# Patient Record
Sex: FEMALE
[De-identification: names, ages, dates, MRNs, and addresses within clinical notes are randomized per-mention and may not be internally consistent; named-entity substitution may affect disease eponyms.]

---

## 2019-09-24 PROBLEM — Z00.00 ENCOUNTER FOR PREVENTIVE HEALTH EXAMINATION: Status: ACTIVE | Noted: 2019-09-24

## 2019-10-01 ENCOUNTER — APPOINTMENT (OUTPATIENT)
Dept: GASTROENTEROLOGY | Facility: CLINIC | Age: 49
End: 2019-10-01
Payer: COMMERCIAL

## 2019-10-01 VITALS — DIASTOLIC BLOOD PRESSURE: 73 MMHG | SYSTOLIC BLOOD PRESSURE: 109 MMHG | HEART RATE: 75 BPM | HEIGHT: 62 IN

## 2019-10-01 DIAGNOSIS — L81.8 OTHER SPECIFIED DISORDERS OF PIGMENTATION: ICD-10-CM

## 2019-10-01 DIAGNOSIS — Z86.010 PERSONAL HISTORY OF COLONIC POLYPS: ICD-10-CM

## 2019-10-01 DIAGNOSIS — Z78.9 OTHER SPECIFIED HEALTH STATUS: ICD-10-CM

## 2019-10-01 DIAGNOSIS — Z80.0 ENCOUNTER FOR SCREENING FOR MALIGNANT NEOPLASM OF COLON: ICD-10-CM

## 2019-10-01 DIAGNOSIS — K64.9 UNSPECIFIED HEMORRHOIDS: ICD-10-CM

## 2019-10-01 DIAGNOSIS — Z12.11 ENCOUNTER FOR SCREENING FOR MALIGNANT NEOPLASM OF COLON: ICD-10-CM

## 2019-10-01 PROCEDURE — 99203 OFFICE O/P NEW LOW 30 MIN: CPT

## 2019-10-01 RX ORDER — SODIUM SULFATE, POTASSIUM SULFATE, MAGNESIUM SULFATE 17.5; 3.13; 1.6 G/ML; G/ML; G/ML
17.5-3.13-1.6 SOLUTION, CONCENTRATE ORAL
Qty: 1 | Refills: 0 | Status: ACTIVE | COMMUNITY
Start: 2019-10-01 | End: 1900-01-01

## 2019-10-14 ENCOUNTER — APPOINTMENT (OUTPATIENT)
Dept: GASTROENTEROLOGY | Facility: AMBULATORY MEDICAL SERVICES | Age: 49
End: 2019-10-14

## 2019-10-17 ENCOUNTER — APPOINTMENT (OUTPATIENT)
Dept: OBGYN | Facility: CLINIC | Age: 49
End: 2019-10-17

## 2020-06-16 ENCOUNTER — EMERGENCY (EMERGENCY)
Facility: HOSPITAL | Age: 50
LOS: 0 days | Discharge: HOME | End: 2020-06-16
Attending: EMERGENCY MEDICINE
Payer: COMMERCIAL

## 2020-06-16 VITALS
SYSTOLIC BLOOD PRESSURE: 95 MMHG | DIASTOLIC BLOOD PRESSURE: 51 MMHG | OXYGEN SATURATION: 99 % | HEART RATE: 97 BPM | RESPIRATION RATE: 19 BRPM | TEMPERATURE: 99 F

## 2020-06-16 VITALS — HEIGHT: 62 IN | WEIGHT: 130.07 LBS

## 2020-06-16 DIAGNOSIS — M54.9 DORSALGIA, UNSPECIFIED: ICD-10-CM

## 2020-06-16 DIAGNOSIS — M54.5 LOW BACK PAIN: ICD-10-CM

## 2020-06-16 LAB
APPEARANCE UR: CLEAR — SIGNIFICANT CHANGE UP
BILIRUB UR-MCNC: NEGATIVE — SIGNIFICANT CHANGE UP
COLOR SPEC: YELLOW — SIGNIFICANT CHANGE UP
DIFF PNL FLD: ABNORMAL
GLUCOSE UR QL: NEGATIVE MG/DL — SIGNIFICANT CHANGE UP
KETONES UR-MCNC: NEGATIVE — SIGNIFICANT CHANGE UP
LEUKOCYTE ESTERASE UR-ACNC: ABNORMAL
NITRITE UR-MCNC: NEGATIVE — SIGNIFICANT CHANGE UP
PH UR: 6.5 — SIGNIFICANT CHANGE UP (ref 5–8)
PROT UR-MCNC: NEGATIVE MG/DL — SIGNIFICANT CHANGE UP
SP GR SPEC: 1.01 — SIGNIFICANT CHANGE UP (ref 1.01–1.02)
UROBILINOGEN FLD QL: NEGATIVE MG/DL — SIGNIFICANT CHANGE UP

## 2020-06-16 PROCEDURE — 72100 X-RAY EXAM L-S SPINE 2/3 VWS: CPT | Mod: 26

## 2020-06-16 PROCEDURE — 99284 EMERGENCY DEPT VISIT MOD MDM: CPT

## 2020-06-16 PROCEDURE — 72100 X-RAY EXAM L-S SPINE 2/3 VWS: CPT

## 2020-06-16 PROCEDURE — 81001 URINALYSIS AUTO W/SCOPE: CPT

## 2020-06-16 PROCEDURE — 99283 EMERGENCY DEPT VISIT LOW MDM: CPT | Mod: 25

## 2020-06-16 PROCEDURE — 87086 URINE CULTURE/COLONY COUNT: CPT

## 2020-06-16 RX ORDER — METHOCARBAMOL 500 MG/1
1500 TABLET, FILM COATED ORAL
Qty: 24 | Refills: 0
Start: 2020-06-16 | End: 2020-06-18

## 2020-06-16 RX ORDER — IBUPROFEN 200 MG
600 TABLET ORAL ONCE
Refills: 0 | Status: COMPLETED | OUTPATIENT
Start: 2020-06-16 | End: 2020-06-16

## 2020-06-16 RX ADMIN — Medication 600 MILLIGRAM(S): at 13:57

## 2020-06-16 NOTE — ED ADULT NURSE NOTE - CHPI ED NUR SYMPTOMS NEG
no bladder dysfunction/no bowel dysfunction/no constipation/no difficulty bearing weight/no fatigue/no motor function loss/no neck tenderness/no numbness/no tingling

## 2020-06-16 NOTE — ED ADULT NURSE NOTE - OBJECTIVE STATEMENT
pt is a 49 y/o female w/o pmhx presenting to ED for eval of severe low back pain radiating to the lower leg since 7 days ago w/ intermittent subjective fever resolved w/ PO tylenol. pt denies hematuria, dysuria or abd pain. pt denies trauma fall or lifting. pt states pain worsens in the middle of night. pt denies loss of bowel or bladder. pt notes pain is sharp and tingling. denies changes to gait or pain w/ ambulating. +FROM to leg. pt reports increased sitting in new office setting 2/2 to COVID

## 2020-06-16 NOTE — ED ADULT NURSE NOTE - NSIMPLEMENTINTERV_GEN_ALL_ED
Implemented All Universal Safety Interventions:  [place] to call system. Call bell, personal items and telephone within reach. Instruct patient to call for assistance. Room bathroom lighting operational. Non-slip footwear when patient is off stretcher. Physically safe environment: no spills, clutter or unnecessary equipment. Stretcher in lowest position, wheels locked, appropriate side rails in place.

## 2020-06-16 NOTE — ED STATDOCS - PATIENT PORTAL LINK FT
You can access the FollowMyHealth Patient Portal offered by [facility] by registering at the following website: [email or web address]. By joining [employer]’s FollowMyHealth portal, you will also be able to view your health information using other applications (apps) compatible with our system.

## 2020-06-16 NOTE — ED ADULT TRIAGE NOTE - CHIEF COMPLAINT QUOTE
c/o back ache x1wk, woke up this morning and "feels like a truck hit her" reports feeling achey w/ chills, temp this AM was 99.0, took 2 Tylenol PTA. denies cough, SOB, and chest pain. has not been COVID swabbed or had contact w/ anyone sick recently

## 2020-06-16 NOTE — ED STATDOCS - CARE PROVIDER_API CALL
Selena Armstrong  ORTHOPAEDIC SURGERY  [address] [place] [address]  Phone: [phone]  Fax: [phone]  Follow Up Time: 4-6 Days

## 2020-06-16 NOTE — ED STATDOCS - ATTENDING CONTRIBUTION TO CARE
I, Yojana Jacob MD,  performed the initial face to face bedside interview with this patient regarding history of present illness, review of symptoms and relevant past medical, social and family history.  I completed an independent physical examination.  I was the initial provider who evaluated this patient. I have signed out the follow up of any pending tests (i.e. labs, radiological studies) to the ACP.  I have communicated the patient’s plan of care and disposition with the ACP.  The history, relevant review of systems, past medical and surgical history, medical decision making, and physical examination was documented by the scribe in my presence and I attest to the accuracy of the documentation.

## 2020-06-16 NOTE — ED STATDOCS - OBJECTIVE STATEMENT
49 y/o female with no significant PMHx presents to the ED c/o back pain x1 week. Reports pain feels like a pinching. Radiates to LE. Pt working from home. Took temp this morning, 99F. Denies dysuria, hematuria, cough, constipation, diarrhea, bladder/bowel incontinence, difficulty ambulating. NKDA. No recent travel. No known COVID exposure. No other complaints at this time. PCP: Dr. Hicks.

## 2020-06-16 NOTE — ED STATDOCS - MUSCULOSKELETAL, MLM
range of motion is not limited and there is no muscle tenderness. range of motion is not limited and there is no muscle tenderness. +straight leg raise. Distal neurovascularly intact.

## 2020-06-16 NOTE — ED STATDOCS - PROGRESS NOTE DETAILS
50 y.o F presents with low back pain x 1 week. pt repors R sided low back pain with intermittent radiation down RLE, worse at night when trying to go to bed. Denies fever/chills, urinary retention/incontinence, saddle anesthesias, weakness, numbness or tingling or other complaints at this time.   back: no midline tenderness. Mildly ttp R Lumbosacral paraspinal muscles. Neg CVAT B/L. LE muscle strength equal B/L. -Nickolas Juarez PA-C Results reviewed and discussed with pt. Pain likely msk, will dc with nsaids and robaxin, Discussed importance of close FU with PMD. Pt asked to return to ED immediately for any new or concerning sx or worsening. Pt acknowledges and understands plan -Nickolas Juarez PA-C

## 2020-06-17 LAB
CULTURE RESULTS: SIGNIFICANT CHANGE UP
SPECIMEN SOURCE: SIGNIFICANT CHANGE UP

## 2020-12-21 PROBLEM — Z12.11 ENCOUNTER FOR COLONOSCOPY IN PATIENT WITH FAMILY HISTORY OF COLON CANCER: Status: RESOLVED | Noted: 2019-10-01 | Resolved: 2020-12-21

## 2023-01-01 NOTE — ED ADULT NURSE NOTE - NSFALLRSKUNASSIST_ED_ALL_ED
Right ear hearing screen completed date: 2023  Right ear screen method: EOAE (evoked otoacoustic emission)  Right ear screen result: Passed  Right ear screen comment: N/A    Left ear hearing screen completed date: 2023  Left ear screen method: EOAE (evoked otoacoustic emission)  Left ear screen result: Passed  Left ear screen comments: N/A  
no

## 2023-12-03 ENCOUNTER — OFFICE (OUTPATIENT)
Dept: URBAN - METROPOLITAN AREA CLINIC 12 | Facility: CLINIC | Age: 53
Setting detail: OPHTHALMOLOGY
End: 2023-12-03
Payer: COMMERCIAL

## 2023-12-03 DIAGNOSIS — H40.013: ICD-10-CM

## 2023-12-03 DIAGNOSIS — H52.13: ICD-10-CM

## 2023-12-03 DIAGNOSIS — H10.433: ICD-10-CM

## 2023-12-03 DIAGNOSIS — H43.393: ICD-10-CM

## 2023-12-03 PROCEDURE — 92083 EXTENDED VISUAL FIELD XM: CPT | Performed by: OPTOMETRIST

## 2023-12-03 PROCEDURE — 99213 OFFICE O/P EST LOW 20 MIN: CPT | Performed by: OPTOMETRIST

## 2023-12-03 PROCEDURE — 92015 DETERMINE REFRACTIVE STATE: CPT | Performed by: OPTOMETRIST

## 2023-12-03 PROCEDURE — 76514 ECHO EXAM OF EYE THICKNESS: CPT | Performed by: OPTOMETRIST

## 2023-12-03 ASSESSMENT — REFRACTION_CURRENTRX
OD_SPHERE: -0.75
OS_CYLINDER: -0.25
OD_ADD: +1.75
OS_SPHERE: -0.25
OS_VPRISM_DIRECTION: PROGS
OD_OVR_VA: 20/
OS_SPHERE: -0.25
OD_AXIS: 043
OD_CYLINDER: -0.25
OS_ADD: +1.75
OS_VPRISM_DIRECTION: BF
OD_CYLINDER: -0.25
OS_AXIS: 129
OS_OVR_VA: 20/
OD_SPHERE: -0.50
OS_CYLINDER: -0.25
OD_VPRISM_DIRECTION: PROGS
OD_AXIS: 046
OD_VPRISM_DIRECTION: BF
OS_OVR_VA: 20/
OD_OVR_VA: 20/
OS_AXIS: 133

## 2023-12-03 ASSESSMENT — SPHEQUIV_DERIVED
OS_SPHEQUIV: -0.625
OS_SPHEQUIV: -0.625
OD_SPHEQUIV: -0.625
OD_SPHEQUIV: -0.625

## 2023-12-03 ASSESSMENT — REFRACTION_AUTOREFRACTION
OS_CYLINDER: -0.25
OS_SPHERE: -0.50
OD_SPHERE: -0.50
OD_CYLINDER: -0.25
OD_AXIS: 126
OS_AXIS: 064

## 2023-12-03 ASSESSMENT — CONFRONTATIONAL VISUAL FIELD TEST (CVF)
OS_FINDINGS: FULL
OD_FINDINGS: FULL

## 2023-12-03 ASSESSMENT — REFRACTION_MANIFEST
OD_AXIS: 126
OS_VA1: 20/20
OD_SPHERE: -0.50
OS_AXIS: 064
OS_CYLINDER: -0.25
OS_SPHERE: -0.50
OD_CYLINDER: -0.25
OD_VA1: 20/20

## 2025-01-18 ENCOUNTER — OFFICE (OUTPATIENT)
Dept: URBAN - METROPOLITAN AREA CLINIC 12 | Facility: CLINIC | Age: 55
Setting detail: OPHTHALMOLOGY
End: 2025-01-18
Payer: MEDICAID

## 2025-01-18 DIAGNOSIS — H52.4: ICD-10-CM

## 2025-01-18 DIAGNOSIS — H40.013: ICD-10-CM

## 2025-01-18 DIAGNOSIS — H10.433: ICD-10-CM

## 2025-01-18 DIAGNOSIS — H43.393: ICD-10-CM

## 2025-01-18 PROBLEM — H52.7 REFRACTIVE ERROR: Status: ACTIVE | Noted: 2025-01-18

## 2025-01-18 PROCEDURE — 92250 FUNDUS PHOTOGRAPHY W/I&R: CPT | Performed by: OPTOMETRIST

## 2025-01-18 PROCEDURE — 92015 DETERMINE REFRACTIVE STATE: CPT | Performed by: OPTOMETRIST

## 2025-01-18 PROCEDURE — 92014 COMPRE OPH EXAM EST PT 1/>: CPT | Performed by: OPTOMETRIST

## 2025-01-18 ASSESSMENT — REFRACTION_CURRENTRX
OS_SPHERE: -0.25
OD_ADD: +1.75
OS_VPRISM_DIRECTION: BF
OS_VPRISM_DIRECTION: PROGS
OS_OVR_VA: 20/
OD_SPHERE: -0.50
OS_CYLINDER: -0.25
OS_SPHERE: -0.25
OS_OVR_VA: 20/
OD_CYLINDER: -0.25
OD_CYLINDER: -0.25
OD_SPHERE: -0.75
OD_VPRISM_DIRECTION: BF
OS_AXIS: 129
OD_AXIS: 043
OD_VPRISM_DIRECTION: PROGS
OS_AXIS: 133
OD_AXIS: 046
OD_OVR_VA: 20/
OS_CYLINDER: -0.25
OS_ADD: +1.75
OD_OVR_VA: 20/

## 2025-01-18 ASSESSMENT — REFRACTION_MANIFEST
OD_SPHERE: -0.50
OS_CYLINDER: -0.25
OD_CYLINDER: SPH
OD_ADD: +2.00
OS_AXIS: 064
OS_AXIS: 075
OS_ADD: +2.00
OS_VA1: 20/20-
OD_VA1: 20/20-
OD_CYLINDER: -0.25
OS_CYLINDER: -0.50
OD_VA1: 20/20
OD_SPHERE: -0.50
OS_SPHERE: -0.25
OS_VA1: 20/20
OD_AXIS: 126
OS_SPHERE: -0.50

## 2025-01-18 ASSESSMENT — TONOMETRY
OD_IOP_MMHG: 21
OS_IOP_MMHG: 21

## 2025-01-18 ASSESSMENT — CONFRONTATIONAL VISUAL FIELD TEST (CVF)
OD_FINDINGS: FULL
OS_FINDINGS: FULL

## 2025-01-18 ASSESSMENT — VISUAL ACUITY
OD_BCVA: 20/20-
OS_BCVA: 20/20-2

## 2025-01-18 ASSESSMENT — PACHYMETRY
OD_CT_CORRECTION: 2
OS_CT_UM: 507
OS_CT_CORRECTION: 3
OD_CT_UM: 517

## 2025-01-18 ASSESSMENT — REFRACTION_AUTOREFRACTION
OD_CYLINDER: SPH
OD_SPHERE: -0.50
OS_SPHERE: +0.25
OS_CYLINDER: -0.75
OS_AXIS: 069

## 2025-01-18 ASSESSMENT — KERATOMETRY
OS_AXISANGLE_DEGREES: 154
OD_AXISANGLE_DEGREES: 001
OS_K2POWER_DIOPTERS: 41.75
OD_K2POWER_DIOPTERS: 41.75
OD_K1POWER_DIOPTERS: 41.00
OS_K1POWER_DIOPTERS: 41.00